# Patient Record
Sex: FEMALE | Race: WHITE | Employment: FULL TIME
[De-identification: names, ages, dates, MRNs, and addresses within clinical notes are randomized per-mention and may not be internally consistent; named-entity substitution may affect disease eponyms.]

---

## 2023-06-14 DIAGNOSIS — R63.5 WEIGHT GAIN: ICD-10-CM

## 2023-06-14 DIAGNOSIS — L73.9 FOLLICULITIS: ICD-10-CM

## 2023-06-14 LAB
ALBUMIN SERPL-MCNC: 3.8 G/DL (ref 3.5–5)
ALBUMIN/GLOB SERPL: 1.1 (ref 0.4–1.6)
ALP SERPL-CCNC: 102 U/L (ref 50–136)
ALT SERPL-CCNC: 22 U/L (ref 12–65)
ANION GAP SERPL CALC-SCNC: 4 MMOL/L (ref 2–11)
AST SERPL-CCNC: 13 U/L (ref 15–37)
BASOPHILS # BLD: 0.1 K/UL (ref 0–0.2)
BASOPHILS NFR BLD: 0 % (ref 0–2)
BILIRUB SERPL-MCNC: 0.5 MG/DL (ref 0.2–1.1)
BUN SERPL-MCNC: 7 MG/DL (ref 6–23)
CALCIUM SERPL-MCNC: 8.7 MG/DL (ref 8.3–10.4)
CHLORIDE SERPL-SCNC: 106 MMOL/L (ref 101–110)
CO2 SERPL-SCNC: 29 MMOL/L (ref 21–32)
CREAT SERPL-MCNC: 0.6 MG/DL (ref 0.6–1)
DIFFERENTIAL METHOD BLD: ABNORMAL
EOSINOPHIL # BLD: 0.1 K/UL (ref 0–0.8)
EOSINOPHIL NFR BLD: 1 % (ref 0.5–7.8)
ERYTHROCYTE [DISTWIDTH] IN BLOOD BY AUTOMATED COUNT: 14.6 % (ref 11.9–14.6)
GLOBULIN SER CALC-MCNC: 3.4 G/DL (ref 2.8–4.5)
GLUCOSE SERPL-MCNC: 88 MG/DL (ref 65–100)
HCT VFR BLD AUTO: 47.4 % (ref 35.8–46.3)
HGB BLD-MCNC: 15.1 G/DL (ref 11.7–15.4)
IMM GRANULOCYTES # BLD AUTO: 0.1 K/UL (ref 0–0.5)
IMM GRANULOCYTES NFR BLD AUTO: 1 % (ref 0–5)
LYMPHOCYTES # BLD: 1.7 K/UL (ref 0.5–4.6)
LYMPHOCYTES NFR BLD: 11 % (ref 13–44)
MCH RBC QN AUTO: 28.9 PG (ref 26.1–32.9)
MCHC RBC AUTO-ENTMCNC: 31.9 G/DL (ref 31.4–35)
MCV RBC AUTO: 90.6 FL (ref 82–102)
MONOCYTES # BLD: 0.7 K/UL (ref 0.1–1.3)
MONOCYTES NFR BLD: 4 % (ref 4–12)
NEUTS SEG # BLD: 13.1 K/UL (ref 1.7–8.2)
NEUTS SEG NFR BLD: 83 % (ref 43–78)
NRBC # BLD: 0 K/UL (ref 0–0.2)
PLATELET # BLD AUTO: 350 K/UL (ref 150–450)
PMV BLD AUTO: 10.4 FL (ref 9.4–12.3)
POTASSIUM SERPL-SCNC: 4.8 MMOL/L (ref 3.5–5.1)
PROT SERPL-MCNC: 7.2 G/DL (ref 6.3–8.2)
RBC # BLD AUTO: 5.23 M/UL (ref 4.05–5.2)
SODIUM SERPL-SCNC: 139 MMOL/L (ref 133–143)
TSH, 3RD GENERATION: 0.54 UIU/ML (ref 0.36–3.74)
WBC # BLD AUTO: 15.7 K/UL (ref 4.3–11.1)

## 2023-06-28 ASSESSMENT — PATIENT HEALTH QUESTIONNAIRE - PHQ9
1. LITTLE INTEREST OR PLEASURE IN DOING THINGS: NOT AT ALL
1. LITTLE INTEREST OR PLEASURE IN DOING THINGS: 0
SUM OF ALL RESPONSES TO PHQ9 QUESTIONS 1 & 2: 0
SUM OF ALL RESPONSES TO PHQ9 QUESTIONS 1 & 2: 0
SUM OF ALL RESPONSES TO PHQ QUESTIONS 1-9: 0
2. FEELING DOWN, DEPRESSED OR HOPELESS: NOT AT ALL
2. FEELING DOWN, DEPRESSED OR HOPELESS: 0
SUM OF ALL RESPONSES TO PHQ QUESTIONS 1-9: 0

## 2023-06-29 ENCOUNTER — OFFICE VISIT (OUTPATIENT)
Dept: INTERNAL MEDICINE CLINIC | Facility: CLINIC | Age: 33
End: 2023-06-29
Payer: COMMERCIAL

## 2023-06-29 VITALS
SYSTOLIC BLOOD PRESSURE: 122 MMHG | HEIGHT: 61 IN | WEIGHT: 190 LBS | DIASTOLIC BLOOD PRESSURE: 82 MMHG | BODY MASS INDEX: 35.87 KG/M2

## 2023-06-29 DIAGNOSIS — L73.9 FOLLICULITIS: Primary | ICD-10-CM

## 2023-06-29 DIAGNOSIS — Z30.09 FAMILY PLANNING COUNSELING: ICD-10-CM

## 2023-06-29 PROCEDURE — 99213 OFFICE O/P EST LOW 20 MIN: CPT | Performed by: PHYSICIAN ASSISTANT

## 2023-06-29 ASSESSMENT — ENCOUNTER SYMPTOMS
EYE PAIN: 0
VOMITING: 0
VOICE CHANGE: 0
COUGH: 0
SHORTNESS OF BREATH: 0
NAUSEA: 0
DIARRHEA: 0
COLOR CHANGE: 0
WHEEZING: 0
CONSTIPATION: 0
SORE THROAT: 0
CHEST TIGHTNESS: 0
ABDOMINAL PAIN: 0

## 2024-06-25 ENCOUNTER — HOSPITAL ENCOUNTER (OUTPATIENT)
Dept: PHYSICAL THERAPY | Age: 34
Setting detail: RECURRING SERIES
Discharge: HOME | End: 2024-06-28
Payer: COMMERCIAL

## 2024-06-25 DIAGNOSIS — N39.3 STRESS INCONTINENCE (FEMALE) (MALE): ICD-10-CM

## 2024-06-25 DIAGNOSIS — R27.8 OTHER LACK OF COORDINATION: Primary | ICD-10-CM

## 2024-06-25 PROCEDURE — 97161 PT EVAL LOW COMPLEX 20 MIN: CPT

## 2024-06-25 PROCEDURE — 97110 THERAPEUTIC EXERCISES: CPT

## 2024-06-25 PROCEDURE — 97530 THERAPEUTIC ACTIVITIES: CPT

## 2024-06-25 NOTE — THERAPY EVALUATION
Ami Dunn  : 1990  Primary: [employer] ([employer])  Secondary:  [facility][note 3] @ [address]  [place] [address]  Phone: [phone]  Fax: [phone] Plan Frequency: 1x/week  Plan of Care/Certification Expiration Date: 24        Plan of Care/Certification Expiration Date:  Plan of Care/Certification Expiration Date: 24    Frequency/Duration: Plan Frequency: 1x/week      Time In/Out:   Time In: 0300  Time Out: 0350    PT Visit Info:    Plan Frequency: 1x/week      Visit Count:  1                OUTPATIENT PHYSICAL THERAPY:             Initial Assessment 2024               Episode (PFPT)         Treatment Diagnosis:    Other lack of coordination  Stress incontinence (female) (male)  Contributing Diagnosis:  Pelvic floor dysfunction in female (M62.98)  Medical/Referring Diagnosis:    PFD (pelvic floor dysfunction) [M62.89]      Referring Physician:  Leesa Mayfield MD MD Orders:  PT Eval and Treat   Return MD Appt:    Date of Onset:  Onset Date: 24     Allergies:  Codeine  Restrictions/Precautions:    None      Medications Last Reviewed:  2024     SUBJECTIVE   History of Injury/Illness (Reason for Referral):  Ms. Dunn is a 33 yo female referred to pelvic floor physical therapy (PFPT) by Leesa Mayfield MD 2/2 PFD (pelvic floor dysfunction) [M62.89].    Pt describes onset of symptoms of urinary incontinence and strong urgency/bladder pressure following the birth of her first child. This became more problematic during her second pregnancy.      Pt had initially experienced pelvic pressure and heaviness, although this seems to be improving as she is further in the healing process.     2020   March 3/31st, 2024 - lost baby as stalled at 7cm  *Both c-sections    Pt describes thoracic pain during her pregnancies but denies low back and/or hip pain.     Not currently exercising regularly.  She enjoys

## 2024-06-25 NOTE — PROGRESS NOTES
Ami Dunn  : 1990  Primary: [employer] ([employer]  [facility]:  [facility] @ [address]  [place] [address]  Phone: [phone]  Fax: [phone] Plan Frequency: 1x/week    Plan of Care/Certification Expiration Date: 24        Plan of Care/Certification Expiration Date:  Plan of Care/Certification Expiration Date: 24    Frequency/Duration:   Plan Frequency: 1x/week      Time In/Out:   Time In: 0300  Time Out: 0350      PT Visit Info:         Visit Count:  1    OUTPATIENT PHYSICAL THERAPY:   Treatment Note 2024       Episode  (PFPT)               Treatment Diagnosis:    Other lack of coordination  Stress incontinence (female) (male)  Contributing Diagnosis:  Pelvic floor dysfunction in female (M62.98)  Medical/Referring Diagnosis:    PFD (pelvic floor dysfunction) [M62.89]    Referring Physician:  Leesa Mayfield MD MD Orders:  PT Eval and Treat   Return MD Appt:     Date of Onset:  Onset Date: 24     Allergies:   Codeine  Restrictions/Precautions:   None      Interventions Planned (Treatment may consist of any combination of the following):     See Assessment Note    Subjective Comments:   See IE dated 24  Initial Pain Level::      /10  Post Session Pain Level:        /10  Medications Last Reviewed:  2024  Updated Objective Findings:  See Evaluation Note from today  Treatment   THERAPEUTIC ACTIVITY: ( 10 minutes): Functional activity education regarding anatomy, pathology and role of pelvic floor muscle (PFM) function in relation to presenting symptoms and role of pelvic floor therapy in conservative treatment. and Instruction on coordinated pelvic floor and diaphragmatic breathing to improve kinesthetic awareness of pelvic muscle mobility and restore proper motor recruitment patterns with breathing, posture, and functional movement (e.g. appropriate lift/contraction with increased IAP such as a cough,

## 2024-07-11 ENCOUNTER — APPOINTMENT (OUTPATIENT)
Dept: PHYSICAL THERAPY | Age: 34
End: 2024-07-11
Payer: COMMERCIAL

## 2024-07-18 ENCOUNTER — APPOINTMENT (OUTPATIENT)
Dept: PHYSICAL THERAPY | Age: 34
End: 2024-07-18
Payer: COMMERCIAL

## 2024-07-26 ENCOUNTER — HOSPITAL ENCOUNTER (OUTPATIENT)
Dept: PHYSICAL THERAPY | Age: 34
Setting detail: RECURRING SERIES
Discharge: HOME | End: 2024-07-29
Payer: COMMERCIAL

## 2024-07-26 PROCEDURE — 97140 MANUAL THERAPY 1/> REGIONS: CPT

## 2024-07-26 PROCEDURE — 97110 THERAPEUTIC EXERCISES: CPT

## 2024-07-26 NOTE — PROGRESS NOTES
Sustained pressure; PIT- Positional inhibition techniques; STM Soft -tissue mobilization; MM- Myofascial mobilization; TrP-Trigger point release; IASTM- Instrument assisted soft tissue mobilizations, TDN-Trigger point dry needling)    Pt gives verbal consent to internal vaginal assessment/treatment without chaperon present.     Treatment/Session Summary:    Treatment Assessment:   Pt with reduced tenderness to palpation of her PFM. She continues to present with PFM tension, weakness, and abdominal weakness. She showed improved PFM coordination and TrA activation by end of session.  Communication/Consultation:    Equipment provided today:  HEP  Recommendations/Intent for next treatment session: Next visit will focus on  Review PFM coordination with digital cues and/or sEMG biofeedback  TrA training - add to HEP  Hip strength assessment  Postural strengthening    >Total Treatment Billable Duration: Treatment minutes 50  Time In: 1110  Time Out: 1200    Anna Parker PT         Charge Capture  Events  [employer] Portal  Appt Desk  Attendance Report     Future Appointments   Date Time Provider Department Center   8/8/2024  1:00 PM Anna Parker PT [facility]

## 2024-08-08 ENCOUNTER — APPOINTMENT (OUTPATIENT)
Dept: PHYSICAL THERAPY | Age: 34
End: 2024-08-08
Payer: COMMERCIAL

## 2024-08-13 ENCOUNTER — HOSPITAL ENCOUNTER (OUTPATIENT)
Dept: PHYSICAL THERAPY | Age: 34
Setting detail: RECURRING SERIES
Discharge: HOME | End: 2024-08-16
Payer: COMMERCIAL

## 2024-08-13 PROCEDURE — 97110 THERAPEUTIC EXERCISES: CPT

## 2024-08-13 PROCEDURE — 97140 MANUAL THERAPY 1/> REGIONS: CPT

## 2024-08-13 NOTE — PROGRESS NOTES
Ami Dunn  : 1990  Primary: [employer] ([employer]  Secondary:  [facility] @ [address]  [place] [address]  Phone: [phone]  Fax: [phone] Plan Frequency: 1x/week    Plan of Care/Certification Expiration Date: 24        Plan of Care/Certification Expiration Date:  Plan of Care/Certification Expiration Date: 24    Frequency/Duration:   Plan Frequency: 1x/week      Time In/Out:   Time In: 808  Time Out: 906      PT Visit Info:         Visit Count:  3    OUTPATIENT PHYSICAL THERAPY:   Treatment Note 2024       Episode  (PFPT)               Treatment Diagnosis:    Other lack of coordination  Stress incontinence (female) (male)  Contributing Diagnosis:  Pelvic floor dysfunction in female (M62.98)  Medical/Referring Diagnosis:    PFD (pelvic floor dysfunction) [M62.89]    Referring Physician:  Leesa Mayfield MD MD Orders:  PT Eval and Treat   Return MD Appt:     Date of Onset:  Onset Date: 24     Allergies:   Codeine  Restrictions/Precautions:   None      Interventions Planned (Treatment may consist of any combination of the following):     See Assessment Note    Subjective Comments:   24:  Pt states she was a little sore following scar massage.   No pain internally following pelvic muscle mobility.   Abdominal muscle soreness following.   Pt experienced pelvic pressure at the end of her cycle with global aching following.  Anterior right sided hip pain with ambulation.     24:   Pelvic pain: Pt describes downward pressure in her pelvis. Feels she wants to provide external support in that area. This does not occur daily.   Lower right abdominal discomfort. Described as pulling sensation. Pain increases when she is bloated and around her cycle.   Urinary: no urinary leakage to report    Initial Pain Level::      /10  Post Session Pain Level:        /10  Medications Last Reviewed:  2024  Updated

## 2024-08-20 ENCOUNTER — APPOINTMENT (OUTPATIENT)
Dept: PHYSICAL THERAPY | Age: 34
End: 2024-08-20
Payer: COMMERCIAL

## 2024-08-22 ENCOUNTER — HOSPITAL ENCOUNTER (OUTPATIENT)
Dept: PHYSICAL THERAPY | Age: 34
Setting detail: RECURRING SERIES
Discharge: HOME | End: 2024-08-25
Payer: COMMERCIAL

## 2024-08-22 PROCEDURE — 97140 MANUAL THERAPY 1/> REGIONS: CPT

## 2024-08-22 PROCEDURE — 97110 THERAPEUTIC EXERCISES: CPT

## 2024-08-22 NOTE — PROGRESS NOTES
Ami Dunn  : 1990  Primary: [employer] ([employer])  Secondary:  [facility] @ [address]  [place] [address]  Phone: [phone]  Fax: [phone] Plan Frequency: 1x/week    Plan of Care/Certification Expiration Date: 24        Plan of Care/Certification Expiration Date:  Plan of Care/Certification Expiration Date: 24    Frequency/Duration:   Plan Frequency: 1x/week      Time In/Out:   Time In: 0115  Time Out: 0200      PT Visit Info:         Visit Count:  4    OUTPATIENT PHYSICAL THERAPY:   Treatment Note 2024       Episode  (PFPT)               Treatment Diagnosis:    Other lack of coordination  Stress incontinence (female) (male)  Contributing Diagnosis:  Pelvic floor dysfunction in female (M62.98)  Medical/Referring Diagnosis:    PFD (pelvic floor dysfunction) [M62.89]    Referring Physician:  Leesa Mayfield MD MD Orders:  PT Eval and Treat   Return MD Appt:     Date of Onset:  Onset Date: 24     Allergies:   Codeine  Restrictions/Precautions:   None      Interventions Planned (Treatment may consist of any combination of the following):     See Assessment Note    Subjective Comments:   24:  Pt without pain/discomfort currently.   Pt denies urinary incontinence.   States exercises are going well.   Right sided anterior hip pain continues. Hip flexor stretch helps. Pain/discomfort eases with walking.     24:  Pt states she was a little sore following scar massage.   No pain internally following pelvic muscle mobility.   Abdominal muscle soreness following.   Pt experienced pelvic pressure at the end of her cycle with global aching following.  Anterior right sided hip pain with ambulation.     24:   Pelvic pain: Pt describes downward pressure in her pelvis. Feels she wants to provide external support in that area. This does not occur daily.   Lower right abdominal discomfort. Described as pulling  HEP  Recommendations/Intent for next treatment session: Next visit will focus on  Review PFM coordination with digital cues and/or sEMG biofeedback  TrA training - add to HEP  Hip strength assessment  Postural strengthening    >Total Treatment Billable Duration: Treatment minutes 44  Time In: 0115  Time Out: 0200    Anna Parker PT         Charge Capture  Events  [employer] Portal  Appt Desk  Attendance Report     Future Appointments   Date Time Provider Department Center   8/27/2024  2:00 PM Anna Parker PT [facility]   9/3/2024  3:00 PM Anna Parker PT [facility]   9/10/2024  3:00 PM Anna Parker PT [facility]   9/17/2024  3:00 PM Anna Parker PT [facility]

## 2024-08-27 ENCOUNTER — HOSPITAL ENCOUNTER (OUTPATIENT)
Dept: PHYSICAL THERAPY | Age: 34
Setting detail: RECURRING SERIES
Discharge: HOME | End: 2024-08-30
Payer: COMMERCIAL

## 2024-08-27 PROCEDURE — 97110 THERAPEUTIC EXERCISES: CPT

## 2024-08-27 PROCEDURE — 97140 MANUAL THERAPY 1/> REGIONS: CPT

## 2024-08-27 NOTE — PROGRESS NOTES
sensation. Pain increases when she is bloated and around her cycle.   Urinary: no urinary leakage to report    Initial Pain Level::      /10  Post Session Pain Level:        /10  Medications Last Reviewed:  8/27/2024  Updated Objective Findings:   PFM contraction: 3/5 Delay in relaxation mild. Quick flicks poorly coordinated  Improved coordination of PF and TrA  Mild tenderness present at R OI only.   PFM overactivity present through LA, but pt responded well to MT  Right psoas group tender to palpation    Treatment   THERAPEUTIC ACTIVITY:     TA Educational Topic Notes Date Completed   Pathology/Anatomy/PFM Function Completed 6/25/24   Bladder health education     Urinary urge suppression     The knack     Voiding strategies     Nocturia tips     Bowel/Bladder log     Bowel health education     Constipation care     Diarrhea/Fecal leakage     Colonic massage     Toilet positioning     Defecation dynamics     Sources of fiber     Return to intercourse/vaginal trainers/wand     Perineal massage     Sexual positioning     Lubricants/vaginal moisturizers     Vulvovaginal health/vaginal irritants     Body mechanics     Posture/ergonomics     Diaphragmatic breathing     Pain science education     Resources and technology     Other patient education        THERAPEUTIC EXERCISE: (40 minutes):    Exercises per grid below to improve mobility, strength, and coordination.  Required moderate visual, verbal, and manual cues to promote proper body mechanics and promote proper body breathing techniques.  Progressed range, repetitions, and complexity of movement as indicated.   Date:  6/25/24 Date:  7/26/24 Date:  8/13/24 Date:  8/22/24 Date:  8/27/24   Activity/Exercise Parameters Parameters Parameters Parameters Parameters   HEP and POC reviewed with pt DB/PF drops 3x/day x 20    3x/day drop + contract  *Supine position Reviewed with digital cues    HEP update Reviewed and updated HEP Reviewed x 10 drops  X 10 exhale +

## 2024-09-03 ENCOUNTER — APPOINTMENT (OUTPATIENT)
Dept: PHYSICAL THERAPY | Age: 34
End: 2024-09-03
Payer: COMMERCIAL

## 2024-09-10 ENCOUNTER — HOSPITAL ENCOUNTER (OUTPATIENT)
Dept: PHYSICAL THERAPY | Age: 34
Setting detail: RECURRING SERIES
Discharge: HOME | End: 2024-09-13
Payer: COMMERCIAL

## 2024-09-10 PROCEDURE — 97110 THERAPEUTIC EXERCISES: CPT

## 2024-09-17 ENCOUNTER — APPOINTMENT (OUTPATIENT)
Dept: PHYSICAL THERAPY | Age: 34
End: 2024-09-17
Payer: COMMERCIAL

## 2025-05-13 ENCOUNTER — CLINICAL DOCUMENTATION (OUTPATIENT)
Dept: PHYSICAL THERAPY | Age: 35
End: 2025-05-13

## 2025-05-13 NOTE — THERAPY DISCHARGE
[facility] @ [address]  [facility] [address]  Phone: [phone]  Fax: [phone]    OUTPATIENT PHYSICAL THERAPY  Discharge Summary 5/13/2025  Episode  Appt Desk         Ms. Dunn's therapy has come to an end at this time due to: Termination of treatment recommended and ordered by physician      Anna Parker, PT